# Patient Record
Sex: MALE | Race: WHITE | NOT HISPANIC OR LATINO | Employment: FULL TIME | URBAN - NONMETROPOLITAN AREA
[De-identification: names, ages, dates, MRNs, and addresses within clinical notes are randomized per-mention and may not be internally consistent; named-entity substitution may affect disease eponyms.]

---

## 2023-04-13 ENCOUNTER — APPOINTMENT (OUTPATIENT)
Dept: GENERAL RADIOLOGY | Facility: HOSPITAL | Age: 33
End: 2023-04-13
Payer: COMMERCIAL

## 2023-04-13 ENCOUNTER — HOSPITAL ENCOUNTER (EMERGENCY)
Facility: HOSPITAL | Age: 33
Discharge: HOME | End: 2023-04-13
Attending: EMERGENCY MEDICINE | Admitting: EMERGENCY MEDICINE
Payer: COMMERCIAL

## 2023-04-13 ENCOUNTER — APPOINTMENT (OUTPATIENT)
Dept: CT IMAGING | Facility: HOSPITAL | Age: 33
End: 2023-04-13
Payer: COMMERCIAL

## 2023-04-13 VITALS
HEART RATE: 83 BPM | OXYGEN SATURATION: 98 % | HEIGHT: 74 IN | RESPIRATION RATE: 16 BRPM | WEIGHT: 180 LBS | SYSTOLIC BLOOD PRESSURE: 161 MMHG | BODY MASS INDEX: 23.1 KG/M2 | DIASTOLIC BLOOD PRESSURE: 106 MMHG | TEMPERATURE: 98.4 F

## 2023-04-13 DIAGNOSIS — R03.0 ELEVATED BLOOD PRESSURE READING: Primary | ICD-10-CM

## 2023-04-13 DIAGNOSIS — R42 DIZZINESS, NONSPECIFIC: ICD-10-CM

## 2023-04-13 LAB
ALBUMIN SERPL-MCNC: 3.5 G/DL (ref 3.5–5.2)
ALBUMIN/GLOB SERPL: 1.3 G/DL
ALP SERPL-CCNC: 92 U/L (ref 39–117)
ALT SERPL W P-5'-P-CCNC: 15 U/L (ref 1–41)
ANION GAP SERPL CALCULATED.3IONS-SCNC: 6.7 MMOL/L (ref 5–15)
AST SERPL-CCNC: 17 U/L (ref 1–40)
BACTERIA UR QL AUTO: ABNORMAL /HPF
BASOPHILS # BLD AUTO: 0.04 10*3/MM3 (ref 0–0.2)
BASOPHILS NFR BLD AUTO: 0.6 % (ref 0–1.5)
BILIRUB SERPL-MCNC: 0.2 MG/DL (ref 0–1.2)
BILIRUB UR QL STRIP: NEGATIVE
BUN SERPL-MCNC: 19 MG/DL (ref 6–20)
BUN/CREAT SERPL: 12.8 (ref 7–25)
CALCIUM SPEC-SCNC: 8.9 MG/DL (ref 8.6–10.5)
CHLORIDE SERPL-SCNC: 99 MMOL/L (ref 98–107)
CLARITY UR: CLEAR
CO2 SERPL-SCNC: 28.3 MMOL/L (ref 22–29)
COLOR UR: YELLOW
CREAT SERPL-MCNC: 1.48 MG/DL (ref 0.76–1.27)
DEPRECATED RDW RBC AUTO: 38.1 FL (ref 37–54)
EGFRCR SERPLBLD CKD-EPI 2021: 63.7 ML/MIN/1.73
EOSINOPHIL # BLD AUTO: 0.12 10*3/MM3 (ref 0–0.4)
EOSINOPHIL NFR BLD AUTO: 1.9 % (ref 0.3–6.2)
ERYTHROCYTE [DISTWIDTH] IN BLOOD BY AUTOMATED COUNT: 11.8 % (ref 12.3–15.4)
GLOBULIN UR ELPH-MCNC: 2.8 GM/DL
GLUCOSE BLDC GLUCOMTR-MCNC: 323 MG/DL (ref 70–130)
GLUCOSE SERPL-MCNC: 372 MG/DL (ref 65–99)
GLUCOSE UR STRIP-MCNC: ABNORMAL MG/DL
HCT VFR BLD AUTO: 40.4 % (ref 37.5–51)
HGB BLD-MCNC: 14 G/DL (ref 13–17.7)
HGB UR QL STRIP.AUTO: ABNORMAL
HOLD SPECIMEN: NORMAL
HOLD SPECIMEN: NORMAL
HYALINE CASTS UR QL AUTO: ABNORMAL /LPF
IMM GRANULOCYTES # BLD AUTO: 0.02 10*3/MM3 (ref 0–0.05)
IMM GRANULOCYTES NFR BLD AUTO: 0.3 % (ref 0–0.5)
KETONES UR QL STRIP: NEGATIVE
LEUKOCYTE ESTERASE UR QL STRIP.AUTO: NEGATIVE
LYMPHOCYTES # BLD AUTO: 1.4 10*3/MM3 (ref 0.7–3.1)
LYMPHOCYTES NFR BLD AUTO: 22.2 % (ref 19.6–45.3)
MAGNESIUM SERPL-MCNC: 2 MG/DL (ref 1.6–2.6)
MCH RBC QN AUTO: 30.6 PG (ref 26.6–33)
MCHC RBC AUTO-ENTMCNC: 34.7 G/DL (ref 31.5–35.7)
MCV RBC AUTO: 88.2 FL (ref 79–97)
MONOCYTES # BLD AUTO: 0.32 10*3/MM3 (ref 0.1–0.9)
MONOCYTES NFR BLD AUTO: 5.1 % (ref 5–12)
NEUTROPHILS NFR BLD AUTO: 4.42 10*3/MM3 (ref 1.7–7)
NEUTROPHILS NFR BLD AUTO: 69.9 % (ref 42.7–76)
NITRITE UR QL STRIP: NEGATIVE
NRBC BLD AUTO-RTO: 0 /100 WBC (ref 0–0.2)
PH UR STRIP.AUTO: 6.5 [PH] (ref 5–8)
PLATELET # BLD AUTO: 233 10*3/MM3 (ref 140–450)
PMV BLD AUTO: 9.8 FL (ref 6–12)
POTASSIUM SERPL-SCNC: 4.1 MMOL/L (ref 3.5–5.2)
PROT SERPL-MCNC: 6.3 G/DL (ref 6–8.5)
PROT UR QL STRIP: ABNORMAL
RBC # BLD AUTO: 4.58 10*6/MM3 (ref 4.14–5.8)
RBC # UR STRIP: ABNORMAL /HPF
REF LAB TEST METHOD: ABNORMAL
SODIUM SERPL-SCNC: 134 MMOL/L (ref 136–145)
SP GR UR STRIP: 1.02 (ref 1–1.03)
SQUAMOUS #/AREA URNS HPF: ABNORMAL /HPF
TROPONIN T SERPL HS-MCNC: 21 NG/L
TROPONIN T SERPL HS-MCNC: 24 NG/L
UROBILINOGEN UR QL STRIP: ABNORMAL
WBC # UR STRIP: ABNORMAL /HPF
WBC NRBC COR # BLD: 6.32 10*3/MM3 (ref 3.4–10.8)
WHOLE BLOOD HOLD COAG: NORMAL
WHOLE BLOOD HOLD SPECIMEN: NORMAL

## 2023-04-13 PROCEDURE — 81001 URINALYSIS AUTO W/SCOPE: CPT

## 2023-04-13 PROCEDURE — 84484 ASSAY OF TROPONIN QUANT: CPT

## 2023-04-13 PROCEDURE — 93005 ELECTROCARDIOGRAM TRACING: CPT

## 2023-04-13 PROCEDURE — 36415 COLL VENOUS BLD VENIPUNCTURE: CPT

## 2023-04-13 PROCEDURE — 85025 COMPLETE CBC W/AUTO DIFF WBC: CPT

## 2023-04-13 PROCEDURE — 99284 EMERGENCY DEPT VISIT MOD MDM: CPT

## 2023-04-13 PROCEDURE — 70450 CT HEAD/BRAIN W/O DYE: CPT

## 2023-04-13 PROCEDURE — 82962 GLUCOSE BLOOD TEST: CPT

## 2023-04-13 PROCEDURE — 83735 ASSAY OF MAGNESIUM: CPT

## 2023-04-13 PROCEDURE — 80053 COMPREHEN METABOLIC PANEL: CPT

## 2023-04-13 PROCEDURE — 71045 X-RAY EXAM CHEST 1 VIEW: CPT

## 2023-04-13 RX ORDER — ACETAMINOPHEN 325 MG/1
975 TABLET ORAL ONCE
Status: COMPLETED | OUTPATIENT
Start: 2023-04-13 | End: 2023-04-13

## 2023-04-13 RX ORDER — SODIUM CHLORIDE 0.9 % (FLUSH) 0.9 %
10 SYRINGE (ML) INJECTION AS NEEDED
Status: DISCONTINUED | OUTPATIENT
Start: 2023-04-13 | End: 2023-04-13 | Stop reason: HOSPADM

## 2023-04-13 RX ORDER — FLUTICASONE PROPIONATE 50 MCG
2 SPRAY, SUSPENSION (ML) NASAL DAILY
Qty: 9.9 ML | Refills: 0 | Status: SHIPPED | OUTPATIENT
Start: 2023-04-13 | End: 2023-04-20

## 2023-04-13 RX ORDER — MECLIZINE HYDROCHLORIDE 25 MG/1
50 TABLET ORAL ONCE
Status: COMPLETED | OUTPATIENT
Start: 2023-04-13 | End: 2023-04-13

## 2023-04-13 RX ORDER — MECLIZINE HYDROCHLORIDE 25 MG/1
25 TABLET ORAL EVERY 8 HOURS PRN
Qty: 21 TABLET | Refills: 0 | Status: SHIPPED | OUTPATIENT
Start: 2023-04-13

## 2023-04-13 RX ADMIN — MECLIZINE HYDROCHLORIDE 50 MG: 25 TABLET ORAL at 08:46

## 2023-04-13 RX ADMIN — ACETAMINOPHEN 975 MG: 325 TABLET, FILM COATED ORAL at 08:46

## 2023-04-13 NOTE — ED PROVIDER NOTES
Subjective  History of Present Illness:    This is a 32-year-old male, history of diabetes, hyperlipidemia, hypertension who presents emergency room today for 2 days of elevated blood pressure and dizziness.  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  Reports for the last several days he has had to sit down multiple times due to dizziness and vertigo-like symptoms.  He reports high blood pressure over the last several days although he has been taking his lisinopril twice daily and his amlodipine once nightly as prescribed.  He reports that he took an extra dose this morning because his blood pressure was high.  He arrives with a blood pressure 174/113.  He also reports intermittent headaches over the last several days.  Reports that back in March, he was treated for an inner ear infection and is following with an ENT.  He reports that the headache has been off and on for the last 2 weeks and that he has taken ibuprofen and that seems to help.  He reports that this is not the worst headache of his life, gradual onset, not thunderclap-like in nature.  No known fevers.  He is a type I diabetic that is insulin-dependent and takes Novolin R 10 to 15 units every morning, sliding scale at lunch, and 10 to 15 units q. evening.      Nurses Notes reviewed and agree, including vitals, allergies, social history and prior medical history.     REVIEW OF SYSTEMS: All systems reviewed and not pertinent unless noted.  Review of Systems   Cardiovascular: Negative for chest pain.   Neurological: Positive for dizziness, light-headedness and headaches. Negative for syncope, facial asymmetry, speech difficulty and numbness.   All other systems reviewed and are negative.      Past Medical History:   Diagnosis Date   • Diabetes mellitus    • Hyperlipidemia    • Hypertension        Allergies:    Patient has no known allergies.      Past Surgical History:   Procedure Laterality Date   • RETINAL DETACHMENT  "SURGERY Right 01/26/2023         Social History     Socioeconomic History   • Marital status:    Tobacco Use   • Smoking status: Former     Types: Cigarettes     Passive exposure: Past   • Smokeless tobacco: Never   Vaping Use   • Vaping Use: Never used   Substance and Sexual Activity   • Alcohol use: Not Currently   • Drug use: Yes     Types: Marijuana     Comment: Gummies   • Sexual activity: Never         History reviewed. No pertinent family history.    Objective  Physical Exam:  BP (!) 161/106   Pulse 83   Temp 98.4 °F (36.9 °C) (Oral)   Resp 16   Ht 188 cm (74\")   Wt 81.6 kg (180 lb)   SpO2 98%   BMI 23.11 kg/m²      Physical Exam  Vitals and nursing note reviewed.   Constitutional:       General: He is not in acute distress.     Appearance: Normal appearance. He is normal weight. He is not ill-appearing, toxic-appearing or diaphoretic.   HENT:      Head: Normocephalic and atraumatic.      Right Ear: Tympanic membrane, ear canal and external ear normal. There is no impacted cerumen.      Left Ear: Ear canal and external ear normal. There is no impacted cerumen.      Ears:      Comments: There is scarring on the left TM without erythema or bulging.     Nose: Nose normal. No congestion or rhinorrhea.      Mouth/Throat:      Mouth: Mucous membranes are moist.      Pharynx: Oropharynx is clear. No oropharyngeal exudate or posterior oropharyngeal erythema.      Comments: Poor dentition.  Uvula midline nondeviated.  Eyes:      Extraocular Movements: Extraocular movements intact.      Conjunctiva/sclera: Conjunctivae normal.      Pupils: Pupils are equal, round, and reactive to light.   Cardiovascular:      Rate and Rhythm: Normal rate and regular rhythm.      Pulses: Normal pulses.      Heart sounds: Normal heart sounds.   Pulmonary:      Effort: Pulmonary effort is normal. No respiratory distress.      Breath sounds: Normal breath sounds. No stridor. No wheezing, rhonchi or rales.   Abdominal:      " General: There is no distension.      Palpations: Abdomen is soft.      Tenderness: There is no abdominal tenderness. There is no guarding.   Musculoskeletal:         General: Normal range of motion.      Cervical back: Normal range of motion and neck supple. No rigidity or tenderness.   Skin:     General: Skin is warm and dry.      Capillary Refill: Capillary refill takes less than 2 seconds.   Neurological:      General: No focal deficit present.      Mental Status: He is alert and oriented to person, place, and time.      Cranial Nerves: No cranial nerve deficit.      Sensory: No sensory deficit.      Motor: No weakness.      Coordination: Coordination normal.   Psychiatric:         Mood and Affect: Mood normal.         Behavior: Behavior normal.         Thought Content: Thought content normal.         Judgment: Judgment normal.               Procedures    ED Course:    ED Course as of 04/13/23 1108   Thu Apr 13, 2023   0825 EKG interpreted by me.  Sinus rhythm.  Tachycardic.  Rate of 100.  Occasional PVC.  No obvious ST or T wave changes.  Abnormal EKG [CG]      ED Course User Index  [CG] José Luis Johnston,        Lab Results (last 24 hours)     Procedure Component Value Units Date/Time    POC Glucose Once [[id number]]  (Abnormal) Collected: 04/13/23 0812    Specimen: Blood Updated: 04/13/23 0815     Glucose 323 mg/dL      Comment: Serial Number: [id number]:  [id number]       CBC & Differential [[id number]]  (Abnormal) Collected: 04/13/23 0814    Specimen: Blood Updated: 04/13/23 0822    Narrative:      The following orders were created for panel order CBC & Differential.  Procedure                               Abnormality         Status                     ---------                               -----------         ------                     CBC Auto Differential[[id number]]        Abnormal            Final result                 Please view results for these tests on the individual orders.    Comprehensive  Metabolic Panel [[id number]]  (Abnormal) Collected: 04/13/23 0814    Specimen: Blood Updated: 04/13/23 0839     Glucose 372 mg/dL      Comment: Glucose >180, Hemoglobin A1C recommended.        BUN 19 mg/dL      Creatinine 1.48 mg/dL      Sodium 134 mmol/L      Potassium 4.1 mmol/L      Chloride 99 mmol/L      CO2 28.3 mmol/L      Calcium 8.9 mg/dL      Total Protein 6.3 g/dL      Albumin 3.5 g/dL      ALT (SGPT) 15 U/L      AST (SGOT) 17 U/L      Alkaline Phosphatase 92 U/L      Total Bilirubin 0.2 mg/dL      Globulin 2.8 gm/dL      A/G Ratio 1.3 g/dL      BUN/Creatinine Ratio 12.8     Anion Gap 6.7 mmol/L      eGFR 63.7 mL/min/1.73     Narrative:      GFR Normal >60  Chronic Kidney Disease <60  Kidney Failure <15      Single High Sensitivity Troponin T [[id number]]  (Abnormal) Collected: 04/13/23 0814    Specimen: Blood Updated: 04/13/23 0841     HS Troponin T 24 ng/L     Narrative:      High Sensitive Troponin T Reference Range:  <10.0 ng/L- Negative Female for AMI  <15.0 ng/L- Negative Male for AMI  >=10 - Abnormal Female indicating possible myocardial injury.  >=15 - Abnormal Male indicating possible myocardial injury.   Clinicians would have to utilize clinical acumen, EKG, Troponin, and serial changes to determine if it is an Acute Myocardial Infarction or myocardial injury due to an underlying chronic condition.         Magnesium [[id number]]  (Normal) Collected: 04/13/23 0814    Specimen: Blood Updated: 04/13/23 0839     Magnesium 2.0 mg/dL     CBC Auto Differential [[id number]]  (Abnormal) Collected: 04/13/23 0814    Specimen: Blood Updated: 04/13/23 0822     WBC 6.32 10*3/mm3      RBC 4.58 10*6/mm3      Hemoglobin 14.0 g/dL      Hematocrit 40.4 %      MCV 88.2 fL      MCH 30.6 pg      MCHC 34.7 g/dL      RDW 11.8 %      RDW-SD 38.1 fl      MPV 9.8 fL      Platelets 233 10*3/mm3      Neutrophil % 69.9 %      Lymphocyte % 22.2 %      Monocyte % 5.1 %      Eosinophil % 1.9 %      Basophil % 0.6 %      Immature  Grans % 0.3 %      Neutrophils, Absolute 4.42 10*3/mm3      Lymphocytes, Absolute 1.40 10*3/mm3      Monocytes, Absolute 0.32 10*3/mm3      Eosinophils, Absolute 0.12 10*3/mm3      Basophils, Absolute 0.04 10*3/mm3      Immature Grans, Absolute 0.02 10*3/mm3      nRBC 0.0 /100 WBC     Urinalysis With Microscopic If Indicated (No Culture) - Urine, Clean Catch [[id number]]  (Abnormal) Collected: 04/13/23 0829    Specimen: Urine, Clean Catch Updated: 04/13/23 0848     Color, UA Yellow     Appearance, UA Clear     pH, UA 6.5     Specific Gravity, UA 1.021     Glucose, UA >=1000 mg/dL (3+)     Ketones, UA Negative     Bilirubin, UA Negative     Blood, UA Small (1+)     Protein, UA >=300 mg/dL (3+)     Leuk Esterase, UA Negative     Nitrite, UA Negative     Urobilinogen, UA 0.2 E.U./dL    Urinalysis, Microscopic Only - Urine, Clean Catch [[id number]]  (Abnormal) Collected: 04/13/23 0829    Specimen: Urine, Clean Catch Updated: 04/13/23 0851     RBC, UA 3-5 /HPF      WBC, UA None Seen /HPF      Bacteria, UA None Seen /HPF      Squamous Epithelial Cells, UA None Seen /HPF      Hyaline Casts, UA None Seen /LPF      Methodology Manual Light Microscopy    High Sensitivity Troponin T [[id number]]  (Abnormal) Collected: 04/13/23 1014    Specimen: Blood Updated: 04/13/23 1039     HS Troponin T 21 ng/L     Narrative:      High Sensitive Troponin T Reference Range:  <10.0 ng/L- Negative Female for AMI  <15.0 ng/L- Negative Male for AMI  >=10 - Abnormal Female indicating possible myocardial injury.  >=15 - Abnormal Male indicating possible myocardial injury.   Clinicians would have to utilize clinical acumen, EKG, Troponin, and serial changes to determine if it is an Acute Myocardial Infarction or myocardial injury due to an underlying chronic condition.                CT Head Without Contrast    Result Date: 4/13/2023  HEAD CT     4/13/2023 8:41 AM  HISTORY: dizziness  TECHNIQUE: Multiple axial CT images were performed from the  foramen magnum to the vertex. Coronal reformatted images were reconstructed from axial data set. This study was performed with techniques to keep radiation doses as low as reasonably achievable (ALARA). Individualized dose reduction techniques using automated exposure control or adjustment of mA and/or kV according to the patient size were employed. 754.46 mGy.cm  COMPARISON: None  FINDINGS: No acute intracranial hemorrhage or large acute cortical infarct. The brain volume is normal for the patient's age. Ventricles are normal in size and configuration. No midline shift. The basal cisterns are patent.  No skull fracture. The visualized paranasal sinuses and mastoid air cells are clear.      Impression: No acute intracranial abnormality.   CRITICAL RESULT: No.  COMMUNICATION: Per this written report.  Images personally reviewed, interpreted, and dictated by DONAL Cash.          This report was signed and finalized on 4/13/2023 9:05 AM by DONAL Cash.    XR Chest 1 View    Result Date: 4/13/2023  CLINICAL INDICATION:  Weak/Dizzy/AMS triage protocol  EXAMINATION TECHNIQUE: XR CHEST 1 VW-  COMPARISON: None.  FINDINGS: Lungs are clear without evidence of focal consolidation. No pneumothorax. No pleural effusion. Heart and mediastinal contours are within normal limits.      Impression: No acute cardiopulmonary findings.  Images personally reviewed, interpreted and dictated by DONAL Cash.       This report was signed and finalized on 4/13/2023 8:55 AM by DONAL Cash.         MDM    Initial impression of presenting illness: 32-year-old male presents emergency room today for several day history of elevated blood pressure and dizzy/lightheadedness.    DDX: includes but is not limited to: Electrolyte abnormality, vertigo, otitis externa or otitis media, tympanic membrane perforation, viral illness, intracranial abnormality, hypoglycemia, BPV, menieres dx,  among other  etiologies    Patient arrives hemodynamically stable, afebrile, nontachycardic, nontoxic-appearing with vitals interpreted by myself.     Pertinent features from physical exam: Cranial nerves II through XII grossly intact.  Patient has intact coordination without any difficulty.  There is no evidence of nystagmus, extraocular muscles are intact without pain on movement.  Pupils are PERRLA.  Cardiac auscultation regular rate and rhythm, lungs clear to auscultation bilaterally..  Patient does appear to have fluid present behind tympanic membrane, particularly worse on left that could be related to the cause of his symptoms.    Initial diagnostic plan: CT head without contrast, EKG, CBC, troponin, CMP, magnesium, urinalysis, chest x-ray    Results from initial plan were reviewed and interpreted by me revealing point-of-care glucose of 323, CT of the head per radiology interpretation without any acute abnormality, chest x-ray, without any acute cardiopulmonary process as interpreted by radiology, I personally evaluated this plain film and concur with radiology interpretation.  EKG with a sinus rhythm, rate of 100, occasional PVC per attending interpretation.  Initial troponin of 24 with a repeat troponin of 21 with a delta of -3.  Given his elevated creatinine of 1.48 on cmp, believe his elevated troponin and creatinine are likely secondary to the patient's hypertension.  CMP also with glucose of 372 and sodium of 134 otherwise unremarkable.  Magnesium within normal limits at 2.0.  Urinalysis with 3-5 red blood cells, small 1+ blood, glucose and protein.  In absence of any urinary symptoms, recommended repeat urinalysis and follow-up with primary care for this.     Patient did make me aware that on laboratory studies 1 year ago that he noticed his creatinine had been elevated.    Diagnostic information from other sources: N/A    Interventions / Re-evaluation: Given Antivert 50 mg and Tylenol 975 mg for symptomatic  treatment.  Patient does report improvement in his symptoms.  Stable for discharge home.    Results/clinical rationale were discussed with patient at bedside.  Discussed follow-up again with ENT and primary care.  Strict return precautions given.    Consultations/Discussion of results with other physicians: Discussed with attending prior to discharge.    Disposition plan: Discharge home.  Will place on Antivert every 8 hours as needed at 25 mg.  Follow-up with ENT and primary care.  Return precautions given.  -----    Final diagnoses:   Elevated blood pressure reading   Dizziness, nonspecific        Will Salazar PA-C  04/13/23 1138

## 2023-04-13 NOTE — DISCHARGE INSTRUCTIONS
Return to ER for any worsening symptoms.  Recommend continue follow-up with ENT and primary care.  Continue taking blood pressure medications as prescribed to help control your high blood pressure.  Keep a blood pressure log and present this to primary care when you visit them next.  I have sent several medications to your pharmacy, make sure to pick these up and take as directed.

## 2023-04-13 NOTE — Clinical Note
[facility] EMERGENCY DEPARTMENT  [address] [address]  Phone: [phone]    Taqueria Hanks was seen and treated in our emergency department on 4/13/2023.  He may return to work on 04/18/2023.         Thank you for choosing [facility].    Will Salazar PA-C